# Patient Record
Sex: MALE | Race: WHITE | URBAN - METROPOLITAN AREA
[De-identification: names, ages, dates, MRNs, and addresses within clinical notes are randomized per-mention and may not be internally consistent; named-entity substitution may affect disease eponyms.]

---

## 2018-12-12 ENCOUNTER — HOSPITAL ENCOUNTER (OUTPATIENT)
Dept: LAB | Age: 53
Discharge: HOME | End: 2018-12-12
Payer: OTHER GOVERNMENT

## 2018-12-12 ENCOUNTER — OFFICE VISIT (OUTPATIENT)
Dept: ONCOLOGY | Age: 53
End: 2018-12-12

## 2018-12-12 ENCOUNTER — HOSPITAL ENCOUNTER (OUTPATIENT)
Dept: ONCOLOGY | Age: 53
Discharge: HOME | End: 2018-12-12

## 2018-12-12 VITALS
DIASTOLIC BLOOD PRESSURE: 93 MMHG | SYSTOLIC BLOOD PRESSURE: 133 MMHG | HEIGHT: 73 IN | TEMPERATURE: 97.7 F | RESPIRATION RATE: 16 BRPM | BODY MASS INDEX: 31.54 KG/M2 | WEIGHT: 238 LBS | HEART RATE: 72 BPM

## 2018-12-12 DIAGNOSIS — D72.819 CHRONIC LEUKOPENIA: Primary | ICD-10-CM

## 2018-12-12 DIAGNOSIS — D72.819 CHRONIC LEUKOPENIA: ICD-10-CM

## 2018-12-12 LAB
ALBUMIN SERPL-MCNC: 4.4 G/DL (ref 3.4–5)
ALBUMIN/GLOB SERPL: 1.2 {RATIO} (ref 0.8–1.7)
ALP SERPL-CCNC: 55 U/L (ref 45–117)
ALT SERPL-CCNC: 49 U/L (ref 16–61)
ANION GAP SERPL CALC-SCNC: 10 MMOL/L (ref 3–18)
AST SERPL-CCNC: 31 U/L (ref 15–37)
BASO+EOS+MONOS # BLD AUTO: 1 K/UL (ref 0–2.3)
BASO+EOS+MONOS # BLD AUTO: 17 % (ref 0.1–17)
BILIRUB SERPL-MCNC: 0.9 MG/DL (ref 0.2–1)
BUN SERPL-MCNC: 21 MG/DL (ref 7–18)
BUN/CREAT SERPL: 19 (ref 12–20)
CALCIUM SERPL-MCNC: 10 MG/DL (ref 8.5–10.1)
CHLORIDE SERPL-SCNC: 94 MMOL/L (ref 100–108)
CO2 SERPL-SCNC: 29 MMOL/L (ref 21–32)
COMMENT , 490046: NORMAL
CREAT SERPL-MCNC: 1.1 MG/DL (ref 0.6–1.3)
DIFFERENTIAL METHOD BLD: NORMAL
ERYTHROCYTE [DISTWIDTH] IN BLOOD BY AUTOMATED COUNT: 11.9 % (ref 11.5–14.5)
FLOW INTERPRETATION: NORMAL
GLOBULIN SER CALC-MCNC: 3.7 G/DL (ref 2–4)
GLUCOSE SERPL-MCNC: 95 MG/DL (ref 74–99)
HCT VFR BLD AUTO: 46.1 % (ref 36–48)
HGB BLD-MCNC: 15.8 G/DL (ref 12–16)
LYMPHOCYTES # BLD: 1.5 K/UL (ref 1.1–5.9)
LYMPHOCYTES NFR BLD: 25 % (ref 14–44)
MCH RBC QN AUTO: 32.8 PG (ref 25–35)
MCHC RBC AUTO-ENTMCNC: 34.3 G/DL (ref 31–37)
MCV RBC AUTO: 95.8 FL (ref 78–102)
NEUTS SEG # BLD: 3.3 K/UL (ref 1.8–9.5)
NEUTS SEG NFR BLD: 58 % (ref 40–70)
PATHOLOGIST NAME: NORMAL
PLATELET # BLD AUTO: 190 K/UL (ref 140–440)
POTASSIUM SERPL-SCNC: 3.7 MMOL/L (ref 3.5–5.5)
PROT SERPL-MCNC: 8.1 G/DL (ref 6.4–8.2)
RBC # BLD AUTO: 4.81 M/UL (ref 4.1–5.1)
SODIUM SERPL-SCNC: 133 MMOL/L (ref 136–145)
SPECIMEN SOURCE: NORMAL
SPECIMEN SOURCE: NORMAL
WBC # BLD AUTO: 5.8 K/UL (ref 4.5–13)

## 2018-12-12 PROCEDURE — 80053 COMPREHEN METABOLIC PANEL: CPT

## 2018-12-12 PROCEDURE — 84165 PROTEIN E-PHORESIS SERUM: CPT

## 2018-12-12 PROCEDURE — 88184 FLOWCYTOMETRY/ TC 1 MARKER: CPT

## 2018-12-12 PROCEDURE — 36415 COLL VENOUS BLD VENIPUNCTURE: CPT

## 2018-12-12 PROCEDURE — 88185 FLOWCYTOMETRY/TC ADD-ON: CPT

## 2018-12-12 RX ORDER — HYDROCHLOROTHIAZIDE 25 MG/1
TABLET ORAL
COMMUNITY
Start: 2018-11-28

## 2018-12-12 RX ORDER — LIDOCAINE 50 MG/G
OINTMENT TOPICAL
COMMUNITY
Start: 2017-05-09

## 2018-12-12 NOTE — PROGRESS NOTES
Hematology/Oncology Consultation Note    Name: Vianney Matthews  Date: 2018  : 1965    No primary care provider on file. Mr. Phil Musa  is a 47 y.o. man who has a long-standing history of chronic leukopenia. He was referred for an assessment. Subjective:   Chief complaint: Low WBC count    History of present illness:  Mr. Phil Musa is a 60-year-old man who states that for several years his WBC count has been below normal.  He has been followed by his primary care physician. A recent CBC from 2018 showed a WBC count of 3.7, hemoglobin 16.4 g/dL, hematocrit 47.7%, and a platelet count was 266,540. The differential WBC count showed that his monocytes population was increased at 15.4% who has his lymphocytes were decreased at 23.7% and the neutrophils were normal at 57.4%. He denies having any recurrent infections or skin rashes. He is here for complete assessment to help define an etiology for his lab abnormality. He denies having an antecedent history of any type of hematologic disorder. Past Medical History:   Diagnosis Date    Hypertension     Sleep apnea        No Known Allergies    History reviewed. No pertinent surgical history. Social History     Socioeconomic History    Marital status: UNKNOWN     Spouse name: Not on file    Number of children: Not on file    Years of education: Not on file    Highest education level: Not on file   Social Needs    Financial resource strain: Not on file    Food insecurity - worry: Not on file    Food insecurity - inability: Not on file   [employer] needs - medical: Not on file   [employer] needs - non-medical: Not on file   Occupational History    Not on file   Tobacco Use    Smoking status: Never Smoker    Smokeless tobacco: Never Used   Substance and Sexual Activity    Alcohol use:  Yes    Drug use: No    Sexual activity: Yes     Partners: Female   Other Topics Concern    Not on file   Social History Narrative    Not on file       Family History   Problem Relation Age of Onset    Cancer Mother     Heart Disease Mother     Cancer Father     Hypertension Father     No Known Problems Sister     Heart Disease Brother     Hypertension Brother        Current Outpatient Medications   Medication Sig Dispense Refill    lidocaine (XYLOCAINE) 5 % ointment Use 1 Application to affected area 2 Times Daily As Needed for Pain. 5% topical. Apply nickel to quarter size to affected area. 30day supply      hydroCHLOROthiazide (HYDRODIURIL) 25 mg tablet        Review of Systems    General ROS:The patient has no complaints and there is no physical distress evident. Psychological ROS: patient denies having any psychological symptoms such as hallucinations, depression or anxiety. Ophthalmic ROS:the patient denies having any visual impairment or eye discomfort. ENT ROS: there are no abnormalities reported. Allergy and Immunology ROS:the patient denies having any seasonal allergies or allergies to medications other than those already outlined above. Hematological and Lymphatic ROS: the patient denies having any bruising, bleeding or lymphadenopathy. Endocrine ROS: the patient denies having any heat or cold intolerance. There is no history of diabetes or thyroid disorders. Breast ROS: the patient denies having any history of breast mass, nipple discharge, or lumps. Respiratory ROS:the patient denies having any cough, shortness of breath, or dyspnea on exertion. Cardiovascular ROS: there are no complaints of chest pain, palpitations, chest pounding, or dyspnea on exertion. Gastrointestinal ROS: the patient denies having nausea, emesis, diarrhea, constipation, or blood in the stool. Genito-Urinary ROS: the patient denies having urinary urgency, frequency, or dysuria. Musculoskeletal ROS: with the exception of mild arthralgias the patient has no other musculoskeletal complaints.   Neurological ROS: the patient denies having any numbness, tingling, or neurologic deficits. Dermatological ROS:patient denies having any unexplained rash, skin ulcerations, or hives. Objective:     Visit Vitals  BP (!) 133/93   Pulse 72   Temp 97.7 °F (36.5 °C) (Oral)   Resp 16   Ht 6' 1\" (1.854 m)   Wt 108 kg (238 lb)   BMI 31.40 kg/m²        ECOGPS=0; pain score=0/10  Physical Exam:   Gen. Appearance: the patient is in no acute distress. Skin: There is no evidence of bruise or rash. HEENT: The head is normocephalic and atraumatic. The conjunctiva and sclera are clear. Pupils are equal, round, reactive to light, and accommodation. The extraocular movements are intact. ENT reveals no oral mucosal lesions or ulcerations. Neck: Supple without lymphadenopathy or thyromegaly. Lungs: Clear to auscultation and percussion; there are no wheezes or rhonchi. Heart: Regular rate and rhythm; there are no murmurs, gallops, or rubs. Abdomen: Bowel sounds are present and normal.  There is no guarding, tenderness, or hepatosplenomegaly. Extremities: There is no clubbing, cyanosis, or edema. Neurologic: There are no focal neurologic deficits. Lymphatics: There is no palpable peripheral lymphadenopathy. Lab data: The CBC dated 11/8/2018 showed a WBC count of 3.7, hemoglobin 16.4 g/dL, hematocrit 47.7%, and the platelet count was only 77,000. A CBC dated 9/29/2017 that showed a WBC count of 3.4, hemoglobin 16.1 g/dL, hematocrit 46.9%, and the platelet count was 997,012. CBC dated 9/14/2016 showed a WBC count of 3.7, hemoglobin 15.9 g/dL, hematocrit 46.5%, and the platelet count was 026,777. Assessment:   Chronic leukopenia: I have explained to the patient that the diagnostic differential includes chronic benign neutropenia/chronic leukopenia versus an evolving myeloproliferative or lymphoproliferative disorder. There is also a concern that he may be developing a plasma cell dyscrasia.     Plan:   Chronic leukopenia: A comprehensive metabolic panel, immunophenotyping profile, and serum protein electrophoresis will be ordered. I will have the patient return in 2 weeks to review lab data. Pending the results of this test the patient may need to undergo a bone marrow biopsy if there is any evidence of immunophenotypic aberrancy on the immunophenotyping profile. The patient had his questions answered to his satisfaction. Follow-up in 2 weeks. Orders Placed This Encounter    COMPLETE CBC & AUTO DIFF WBC    METABOLIC PANEL, COMPREHENSIVE     Standing Status:   Future     Standing Expiration Date:   12/13/2019    IMMUNOPHENOTYPING PROFILE     Standing Status:   Future     Standing Expiration Date:   12/13/2019     Order Specific Question:   Specimen type     Answer:   Blood [2]    SPEP     Standing Status:   Future     Standing Expiration Date:   12/13/2019    InHouse CBC ([facility])     Standing Status:   Future     Number of Occurrences:   1     Standing Expiration Date:   12/19/2018    hydroCHLOROthiazide (HYDRODIURIL) 25 mg tablet    lidocaine (XYLOCAINE) 5 % ointment     Sig: Use 1 Application to affected area 2 Times Daily As Needed for Pain. 5% topical. Apply nickel to quarter size to affected area. 30day supply           Diana Vicente MD  12/12/2018      Please note: This document has been produced using voice recognition software. Unrecognized errors in transcription may be present.

## 2018-12-12 NOTE — PATIENT INSTRUCTIONS
Neutropenia: Care Instructions  Your Care Instructions  Neutropenia (say \"tvf-nraf-GVW-nee-uh\") means that your blood has too few neutrophils. These are white blood cells that help protect the body from infection. They do this by killing bacteria. Neutropenia can be caused by some types of infection. It also can be caused by immune system conditions such as HIV or lupus, a lack of vitamin K06 or folic acid, or an enlarged spleen. Some medicines can cause it too. It is most often caused by treatments for certain health problems, such as chemotherapy and radiation treatment for cancer. Mild neutropenia usually causes no symptoms. But when it's severe, it increases the risk of infection of your skin and organs. That's because your body can't fight off germs as well as it should. Follow-up care is a key part of your treatment and safety. Be sure to make and go to all appointments, and call your doctor if you are having problems. It's also a good idea to know your test results and keep a list of the medicines you take. How can you care for yourself at home? · Take your medicines exactly as prescribed. Call your doctor if you have any problems with your medicine. · Eat a healthy, balanced diet. Eat foods with a lot of fiber. This helps to prevent constipation. Prevent infections  · Take your temperature several times a day, as your doctor suggests. Keep a written record of your temperature readings. Fever is a common symptom of infection. And it may be the only symptom. · Use a soft toothbrush. Do not floss your teeth. Talk with your doctor about other steps to prevent infections in your mouth. · Wash your hands often with soap and water, especially before you eat and after you use the bathroom. · If you are a woman, use sanitary napkins (pads) instead of tampons. Do not douche. · Do not use rectal thermometers or suppositories.   · Avoid tasks that might expose you to germs, such as disposing of pet feces or urine. · Avoid crowds of people and anyone who might have an infection or an illness such as a cold or the flu. You may need to avoid people who have recently had certain kinds of vaccinations. · Even small injuries can get infected. Take steps to prevent cuts, burns, and sunburns. · If you have severe neutropenia, your doctor may advise you to avoid fresh fruits, vegetables, and flowers. When should you call for help? Call 911 anytime you think you may need emergency care. For example, call if:    · You have severe shortness of breath.     · You passed out (lost consciousness).    Call your doctor now or seek immediate medical care if:    · You have signs of infection, such as:  ? Increased pain, swelling, warmth, or redness of your skin. ? Red streaks leading from a wound. ? Pus draining from a wound. ? A fever.    Watch closely for changes in your health, and be sure to contact your doctor if:    · You do not get better as expected. Where can you learn more? Go to http://mike-negrita.info/. Enter I041 in the search box to learn more about \"Neutropenia: Care Instructions. \"  Current as of: June 26, 2018  Content Version: 11.8  © 1337-1971 [employer]. Care instructions adapted under license by [employer] (which disclaims liability or warranty for this information). If you have questions about a medical condition or this instruction, always ask your healthcare professional. [place] [address] any warranty or liability for your use of this information.

## 2018-12-17 LAB
ALBUMIN SERPL ELPH-MCNC: 4 G/DL (ref 2.9–4.4)
ALBUMIN/GLOB SERPL: 1.1 {RATIO} (ref 0.7–1.7)
ALPHA1 GLOB SERPL ELPH-MCNC: 0.2 G/DL (ref 0–0.4)
ALPHA2 GLOB SERPL ELPH-MCNC: 0.7 G/DL (ref 0.4–1)
B-GLOBULIN SERPL ELPH-MCNC: 1.3 G/DL (ref 0.7–1.3)
GAMMA GLOB SERPL ELPH-MCNC: 1.5 G/DL (ref 0.4–1.8)
GLOBULIN SER CALC-MCNC: 3.7 G/DL (ref 2.2–3.9)
M PROTEIN SERPL ELPH-MCNC: NORMAL G/DL
PROT SERPL-MCNC: 7.7 G/DL (ref 6–8.5)

## 2018-12-27 ENCOUNTER — OFFICE VISIT (OUTPATIENT)
Dept: ONCOLOGY | Age: 53
End: 2018-12-27

## 2018-12-27 VITALS
WEIGHT: 238 LBS | RESPIRATION RATE: 16 BRPM | TEMPERATURE: 97.8 F | OXYGEN SATURATION: 100 % | SYSTOLIC BLOOD PRESSURE: 123 MMHG | HEART RATE: 72 BPM | BODY MASS INDEX: 31.54 KG/M2 | HEIGHT: 73 IN | DIASTOLIC BLOOD PRESSURE: 77 MMHG

## 2018-12-27 DIAGNOSIS — D72.819 CHRONIC LEUKOPENIA: Primary | ICD-10-CM

## 2018-12-27 NOTE — PROGRESS NOTES
Hematology/medical oncology progress note    12/27/2018  Shannon Chu  YOB: 1965    Diagnosis: Benign leukopenia/benign chronic neutropenia    Mr. Rena Cruz is a 52-year-old man who was referred for evaluation of leukopenia/benign neutropenia. On 12/12/2018 the CBC showed that his WBC count was actually normal at 5.8, hemoglobin was 15.8 g/dL, hematocrit 46.1%, and the platelet count was 669,757. His serum protein electrophoresis revealed normal values with no evidence. The comprehensive metabolic panel revealed that his serum creatinine was normal at 1.10 mg/dL and liver enzymes were negative. Flow cytometry from the peripheral blood showed no immunophenotypic abnormalities. There was no evidence of a lymphoproliferative disorder, acute leukemia, or circulating blasts. Therefore his WBC count had normalized. Nonetheless, I have explained to the patient and I will recheck his CBC in about 2 months. At this time it appears that his leukopenia has resolved. The patient had his questions answered to his satisfaction. Total time 25 minutes, greater than 50% of the time was in counseling and coordination of care. Matias Grande MD, [address]

## 2018-12-27 NOTE — PATIENT INSTRUCTIONS
Neutropenia: Care Instructions  Your Care Instructions  Neutropenia (say \"rxw-mabg-NAP-nee-uh\") means that your blood has too few neutrophils. These are white blood cells that help protect the body from infection. They do this by killing bacteria. Neutropenia can be caused by some types of infection. It also can be caused by immune system conditions such as HIV or lupus, a lack of vitamin D66 or folic acid, or an enlarged spleen. Some medicines can cause it too. It is most often caused by treatments for certain health problems, such as chemotherapy and radiation treatment for cancer. Mild neutropenia usually causes no symptoms. But when it's severe, it increases the risk of infection of your skin and organs. That's because your body can't fight off germs as well as it should. Follow-up care is a key part of your treatment and safety. Be sure to make and go to all appointments, and call your doctor if you are having problems. It's also a good idea to know your test results and keep a list of the medicines you take. How can you care for yourself at home? · Take your medicines exactly as prescribed. Call your doctor if you have any problems with your medicine. · Eat a healthy, balanced diet. Eat foods with a lot of fiber. This helps to prevent constipation. Prevent infections  · Take your temperature several times a day, as your doctor suggests. Keep a written record of your temperature readings. Fever is a common symptom of infection. And it may be the only symptom. · Use a soft toothbrush. Do not floss your teeth. Talk with your doctor about other steps to prevent infections in your mouth. · Wash your hands often with soap and water, especially before you eat and after you use the bathroom. · If you are a woman, use sanitary napkins (pads) instead of tampons. Do not douche. · Do not use rectal thermometers or suppositories.   · Avoid tasks that might expose you to germs, such as disposing of pet feces or urine. · Avoid crowds of people and anyone who might have an infection or an illness such as a cold or the flu. You may need to avoid people who have recently had certain kinds of vaccinations. · Even small injuries can get infected. Take steps to prevent cuts, burns, and sunburns. · If you have severe neutropenia, your doctor may advise you to avoid fresh fruits, vegetables, and flowers. When should you call for help? Call 911 anytime you think you may need emergency care. For example, call if:    · You have severe shortness of breath.     · You passed out (lost consciousness).    Call your doctor now or seek immediate medical care if:    · You have signs of infection, such as:  ? Increased pain, swelling, warmth, or redness of your skin. ? Red streaks leading from a wound. ? Pus draining from a wound. ? A fever.    Watch closely for changes in your health, and be sure to contact your doctor if:    · You do not get better as expected. Where can you learn more? Go to http://mike-negrita.info/. Enter U065 in the search box to learn more about \"Neutropenia: Care Instructions. \"  Current as of: June 26, 2018  Content Version: 11.8  © 3524-8466 [employer]. Care instructions adapted under license by [employer] (which disclaims liability or warranty for this information). If you have questions about a medical condition or this instruction, always ask your healthcare professional. [place] [address] any warranty or liability for your use of this information.